# Patient Record
Sex: FEMALE | Race: WHITE | NOT HISPANIC OR LATINO | Employment: UNEMPLOYED | URBAN - METROPOLITAN AREA
[De-identification: names, ages, dates, MRNs, and addresses within clinical notes are randomized per-mention and may not be internally consistent; named-entity substitution may affect disease eponyms.]

---

## 2023-04-14 DIAGNOSIS — I10 ESSENTIAL (PRIMARY) HYPERTENSION: ICD-10-CM

## 2023-04-19 RX ORDER — DILTIAZEM HYDROCHLORIDE 60 MG/1
TABLET, FILM COATED ORAL
Qty: 60 TABLET | Refills: 0 | Status: SHIPPED | OUTPATIENT
Start: 2023-04-19 | End: 2023-04-20 | Stop reason: SDUPTHER

## 2023-04-20 RX ORDER — DILTIAZEM HYDROCHLORIDE 60 MG/1
60 TABLET, FILM COATED ORAL DAILY
Qty: 60 TABLET | Refills: 0 | Status: SHIPPED | OUTPATIENT
Start: 2023-04-20 | End: 2023-06-14

## 2023-05-05 DIAGNOSIS — I10 BENIGN ESSENTIAL HYPERTENSION: ICD-10-CM

## 2023-05-05 RX ORDER — TRIAMTERENE/HYDROCHLOROTHIAZID 37.5-25 MG
1 TABLET ORAL DAILY
COMMUNITY
End: 2023-05-05 | Stop reason: SDUPTHER

## 2023-05-08 RX ORDER — TRIAMTERENE/HYDROCHLOROTHIAZID 37.5-25 MG
1 TABLET ORAL DAILY
Qty: 30 TABLET | Refills: 0 | Status: SHIPPED | OUTPATIENT
Start: 2023-05-08 | End: 2023-05-10

## 2023-06-13 DIAGNOSIS — I10 ESSENTIAL (PRIMARY) HYPERTENSION: ICD-10-CM

## 2023-06-14 RX ORDER — DILTIAZEM HYDROCHLORIDE 60 MG/1
TABLET, FILM COATED ORAL
Qty: 30 TABLET | Refills: 0 | Status: SHIPPED | OUTPATIENT
Start: 2023-06-14 | End: 2023-08-11

## 2023-06-23 ENCOUNTER — OFFICE VISIT (OUTPATIENT)
Dept: PRIMARY CARE | Facility: CLINIC | Age: 88
End: 2023-06-23
Payer: MEDICARE

## 2023-06-23 VITALS
OXYGEN SATURATION: 94 % | BODY MASS INDEX: 22.04 KG/M2 | WEIGHT: 128.4 LBS | HEART RATE: 67 BPM | SYSTOLIC BLOOD PRESSURE: 124 MMHG | DIASTOLIC BLOOD PRESSURE: 78 MMHG

## 2023-06-23 DIAGNOSIS — R73.02 IGT (IMPAIRED GLUCOSE TOLERANCE): ICD-10-CM

## 2023-06-23 DIAGNOSIS — E03.9 HYPOTHYROIDISM, UNSPECIFIED TYPE: ICD-10-CM

## 2023-06-23 DIAGNOSIS — D64.9 ANEMIA, UNSPECIFIED TYPE: Primary | ICD-10-CM

## 2023-06-23 DIAGNOSIS — E78.5 DYSLIPIDEMIA: ICD-10-CM

## 2023-06-23 DIAGNOSIS — Z00.00 ROUTINE GENERAL MEDICAL EXAMINATION AT A HEALTH CARE FACILITY: ICD-10-CM

## 2023-06-23 PROCEDURE — 1159F MED LIST DOCD IN RCRD: CPT | Performed by: EMERGENCY MEDICINE

## 2023-06-23 PROCEDURE — 1157F ADVNC CARE PLAN IN RCRD: CPT | Performed by: EMERGENCY MEDICINE

## 2023-06-23 PROCEDURE — 99214 OFFICE O/P EST MOD 30 MIN: CPT | Performed by: EMERGENCY MEDICINE

## 2023-06-23 RX ORDER — LATANOPROST 50 UG/ML
SOLUTION/ DROPS OPHTHALMIC
COMMUNITY

## 2023-06-23 RX ORDER — SIMVASTATIN 20 MG/1
20 TABLET, FILM COATED ORAL DAILY
COMMUNITY
End: 2023-10-12 | Stop reason: SDUPTHER

## 2023-06-23 RX ORDER — ASPIRIN 81 MG/1
1 TABLET ORAL DAILY
COMMUNITY
Start: 2020-06-10

## 2023-06-23 RX ORDER — EPINEPHRINE 0.22MG
1 AEROSOL WITH ADAPTER (ML) INHALATION DAILY
COMMUNITY
Start: 2020-06-10

## 2023-06-23 ASSESSMENT — ENCOUNTER SYMPTOMS
OCCASIONAL FEELINGS OF UNSTEADINESS: 0
LOSS OF SENSATION IN FEET: 0
DEPRESSION: 0

## 2023-06-23 NOTE — PROGRESS NOTES
Diagnoses/Problems     · Encounter for preventive health examination (V70.0) (Z00.00)     · Anxiety and depression (300.00,311) (F41.9,F32.A)   · Benign essential hypertension (401.1) (I10)   · Added by Problem List Migration; 2013-12-14   · COPD (chronic obstructive pulmonary disease) (496) (J44.9)   · Hyperlipidemia (272.4) (E78.5)     Provider Impressions     88-year-old lady for office visit      Kidney stone- passed. Follows with urology. Currently does not have any issues     Hypertension- well controlled on triamterene-HCTZ and Cardizem     Hyperlipidemia- on simvastatin     Glaucoma-she is on eyedrops and has a regular eye doctor follow-up     COPD- currently does not take any medications     Anxiety and depression- doing well without medications, has declined medication in past      States that she takes Xarelto although this is not in our record     Preventive care  At the age of 88, she is not a candidate for any preventive care     Follow-up in 3 months or as needed      Chief Complaint     Follow up      History of Present IllnessThis is a 88-year-old lady who is here for an office visit     Patient is here for a follow up on long term health maintenance and medication management.  Small sore on her nose from previous fall having difficulty healing. Plans to see a dermatologist.      History from hospitalization-  Patient was admitted with positive COVID test  A chest x-ray did not show any pneumonia  ID recommended monoclonal antibody and steroids  Patient declined this  Her pulse ox is normal and she was discharged in stable condition     Past history includes hypertension, hyperlipidemia, COPD, current and chronic nicotine dependence, previous colonoscopy, kidney stone     Family history is not contributory     Social history-continues to smoke  Denies alcohol or drugs      Review of Systems  Comprehensive review of symptoms was not positive for any symptoms other than HPI        Active Problems      · Anxiety and depression (300.00,311) (F41.9,F32.A)   · Benign essential hypertension (401.1) (I10)   · Added by Problem List Migration; 2013-12-14   · Bursitis (727.3) (M71.9)   · Cataract, acquired (366.9) (H26.9)   · COPD (chronic obstructive pulmonary disease) (496) (J44.9)   · COVID-19 (079.89) (U07.1)   · Encounter for Medicare annual wellness exam (V70.0) (Z00.00)   · Encounter for support and coordination of transition of care (V65.8) (Z76.89)   · Enlarged lymph node (785.6) (R59.9)   · Essential hypertension (401.9) (I10)   · High cholesterol (272.0) (E78.00)   · Hyperlipidemia (272.4) (E78.5)   · IGT (impaired glucose tolerance) (790.22) (R73.02)   · Insomnia (780.52) (G47.00)   · Palpitations (785.1) (R00.2)   · Vitamin D insufficiency (268.9) (E55.9)     Social History     · Current every day smoker (305.1) (F17.200)   · Occasional alcohol use     Allergies     · No Known Drug Allergies   Recorded By: Raeann Christianson; 4/4/2014 11:40:44 AM     Current Meds     Medication Name Instruction   Aspirin 81 MG Oral Tablet Delayed Release TAKE 1 TABLET DAILY.   CoQ10 100 MG Oral Capsule TAKE 1 CAPSULE Daily   dilTIAZem HCl - 60 MG Oral Tablet Take 1 tablet daily   dilTIAZem HCl ER Beads 120 MG Oral Capsule Extended Release 24 Hour TAKE 1 CAPSULE ONCE DAILY.   Latanoprost 0.005 % Ophthalmic Solution INSTILL 1 DROP IN BOTH EYES AT BEDTIME.   Simvastatin 20 MG Oral Tablet TAKE 1 TABLET DAILY.   Triamterene-HCTZ 37.5-25 MG Oral Tablet Take 1 tablet daily      Vitals  Vital Signs     Recorded: [id number] 01:17PM   Heart Rate 70   Systolic 115   Diastolic 73   Height 5 ft 4 in   Weight 130 lb    BMI Calculated 22.31 kg/m2   BSA Calculated 1.63   Tobacco Use a) Yes   Falls Screening (Age 18+) b) One or more falls in the last year   O2 Saturation 90      Physical Exam  General appearance: Alert and in no acute distress  HEENT: Normal Inspection  Neck - Normal Inspection.   Respiratory : No respiratory distress.    Cardiovascular: heart rate normal. No gallop  Back - normal inspection  Skin inspection:Warm  Musculoskeletal : No deformities.   No redness, warmth or pain  Neuro : Grossly Intact  Psychiatric : Cooperative

## 2023-07-06 LAB
NON-UH HIE A/G RATIO: 1.1
NON-UH HIE ALB: 4.1 G/DL (ref 3.4–5)
NON-UH HIE ALK PHOS: 115 UNIT/L (ref 46–116)
NON-UH HIE BASO COUNT: 0.04 X1000 (ref 0–0.2)
NON-UH HIE BASOS %: 0.5 %
NON-UH HIE BILIRUBIN, TOTAL: 0.3 MG/DL (ref 0.2–1)
NON-UH HIE BUN/CREAT RATIO: 27.8
NON-UH HIE BUN: 25 MG/DL (ref 9–23)
NON-UH HIE CALCIUM: 9.6 MG/DL (ref 8.7–10.4)
NON-UH HIE CALCULATED LDL CHOLESTEROL: 76 MG/DL (ref 60–130)
NON-UH HIE CALCULATED OSMOLALITY: 287 MOSM/KG (ref 275–295)
NON-UH HIE CHLORIDE: 104 MMOL/L (ref 98–107)
NON-UH HIE CHOLESTEROL: 166 MG/DL (ref 100–200)
NON-UH HIE CO2, VENOUS: 27 MMOL/L (ref 20–31)
NON-UH HIE CREATININE: 0.9 MG/DL (ref 0.5–0.8)
NON-UH HIE DIFF?: NO
NON-UH HIE EOS COUNT: 0.09 X1000 (ref 0–0.5)
NON-UH HIE EOSIN %: 1.2 %
NON-UH HIE GFR AA: >60
NON-UH HIE GLOBULIN: 3.9 G/DL
NON-UH HIE GLOMERULAR FILTRATION RATE: 59 ML/MIN/1.73M?
NON-UH HIE GLUCOSE: 82 MG/DL (ref 74–106)
NON-UH HIE GOT: 23 UNIT/L (ref 15–37)
NON-UH HIE GPT: 17 UNIT/L (ref 10–49)
NON-UH HIE HCT: 44.5 % (ref 36–46)
NON-UH HIE HDL CHOLESTEROL: 68 MG/DL (ref 40–60)
NON-UH HIE HGB A1C: 5.7 %
NON-UH HIE HGB: 14.8 G/DL (ref 12–16)
NON-UH HIE INSTR WBC: 7.5
NON-UH HIE K: 3.9 MMOL/L (ref 3.5–5.1)
NON-UH HIE LYMPH %: 23.1 %
NON-UH HIE LYMPH COUNT: 1.74 X1000 (ref 1.2–4.8)
NON-UH HIE MCH: 30.7 PG (ref 27–34)
NON-UH HIE MCHC: 33.3 G/DL (ref 32–37)
NON-UH HIE MCV: 92 FL (ref 80–100)
NON-UH HIE MONO %: 10 %
NON-UH HIE MONO COUNT: 0.75 X1000 (ref 0.1–1)
NON-UH HIE MPV: 8.2 FL (ref 7.4–10.4)
NON-UH HIE NA: 142 MMOL/L (ref 135–145)
NON-UH HIE NEUTROPHIL %: 65.3 %
NON-UH HIE NEUTROPHIL COUNT (ANC): 4.92 X1000 (ref 1.4–8.8)
NON-UH HIE NUCLEATED RBC: 0 /100WBC
NON-UH HIE PLATELET: 333 X10 (ref 150–450)
NON-UH HIE RBC: 4.84 X10 (ref 4.2–5.4)
NON-UH HIE RDW: 14.5 % (ref 11.5–14.5)
NON-UH HIE TOTAL CHOL/HDL CHOL RATIO: 2.4
NON-UH HIE TOTAL PROTEIN: 8 G/DL (ref 5.7–8.2)
NON-UH HIE TRIGLYCERIDES: 112 MG/DL (ref 30–150)
NON-UH HIE TSH: 2.09 UIU/ML (ref 0.55–4.78)
NON-UH HIE WBC: 7.5 X10 (ref 4.5–11)

## 2023-07-31 DIAGNOSIS — I10 BENIGN ESSENTIAL HYPERTENSION: ICD-10-CM

## 2023-07-31 RX ORDER — TRIAMTERENE/HYDROCHLOROTHIAZID 37.5-25 MG
1 TABLET ORAL DAILY
Qty: 90 TABLET | Refills: 1 | Status: SHIPPED | OUTPATIENT
Start: 2023-07-31 | End: 2024-05-03 | Stop reason: SDUPTHER

## 2023-08-09 DIAGNOSIS — I10 ESSENTIAL (PRIMARY) HYPERTENSION: ICD-10-CM

## 2023-08-11 RX ORDER — DILTIAZEM HYDROCHLORIDE 60 MG/1
60 TABLET, FILM COATED ORAL DAILY
Qty: 90 TABLET | Refills: 1 | Status: SHIPPED | OUTPATIENT
Start: 2023-08-11 | End: 2023-08-18 | Stop reason: SDUPTHER

## 2023-08-18 DIAGNOSIS — I10 ESSENTIAL (PRIMARY) HYPERTENSION: ICD-10-CM

## 2023-08-18 RX ORDER — DILTIAZEM HYDROCHLORIDE 60 MG/1
60 TABLET, FILM COATED ORAL DAILY
Qty: 90 TABLET | Refills: 1 | Status: SHIPPED | OUTPATIENT
Start: 2023-08-18

## 2023-10-12 DIAGNOSIS — E78.5 HYPERLIPIDEMIA, UNSPECIFIED HYPERLIPIDEMIA TYPE: ICD-10-CM

## 2023-10-12 RX ORDER — SIMVASTATIN 20 MG/1
20 TABLET, FILM COATED ORAL DAILY
Qty: 90 TABLET | Refills: 1 | Status: SHIPPED | OUTPATIENT
Start: 2023-10-12

## 2023-10-27 ENCOUNTER — TELEPHONE (OUTPATIENT)
Dept: PRIMARY CARE | Facility: CLINIC | Age: 88
End: 2023-10-27
Payer: MEDICARE

## 2023-11-01 ENCOUNTER — TELEPHONE (OUTPATIENT)
Dept: PRIMARY CARE | Facility: CLINIC | Age: 88
End: 2023-11-01
Payer: MEDICARE

## 2023-11-01 NOTE — TELEPHONE ENCOUNTER
Trixie Webster Mariam FYI    PT was woken up at 4 in the morning with a reminder call for an appointment.  She got up and almost fell over her oxygen tubing thinking it was something terrible regarding her adult children.     PT's family is real upset and wanted the Dr to know.  They do know it didn't come directly from our office per say, but they are concerned about a glitch in the phone reminder system.

## 2023-11-03 ENCOUNTER — OFFICE VISIT (OUTPATIENT)
Dept: PRIMARY CARE | Facility: CLINIC | Age: 88
End: 2023-11-03
Payer: MEDICARE

## 2023-11-03 VITALS
DIASTOLIC BLOOD PRESSURE: 47 MMHG | SYSTOLIC BLOOD PRESSURE: 117 MMHG | BODY MASS INDEX: 22.2 KG/M2 | HEIGHT: 64 IN | HEART RATE: 67 BPM | OXYGEN SATURATION: 95 % | WEIGHT: 130 LBS

## 2023-11-03 DIAGNOSIS — Z23 NEED FOR INFLUENZA VACCINATION: ICD-10-CM

## 2023-11-03 DIAGNOSIS — S22.49XD CLOSED FRACTURE OF MULTIPLE RIBS WITH ROUTINE HEALING, UNSPECIFIED LATERALITY, SUBSEQUENT ENCOUNTER: ICD-10-CM

## 2023-11-03 DIAGNOSIS — N63.20 MASS OF LEFT BREAST, UNSPECIFIED QUADRANT: ICD-10-CM

## 2023-11-03 DIAGNOSIS — Z76.89 ENCOUNTER FOR SUPPORT AND COORDINATION OF TRANSITION OF CARE: Primary | ICD-10-CM

## 2023-11-03 PROCEDURE — 1159F MED LIST DOCD IN RCRD: CPT | Performed by: EMERGENCY MEDICINE

## 2023-11-03 PROCEDURE — 99495 TRANSJ CARE MGMT MOD F2F 14D: CPT | Performed by: EMERGENCY MEDICINE

## 2023-11-03 NOTE — PROGRESS NOTES
Diagnoses/Problems     · Encounter for preventive health examination (V70.0) (Z00.00)     · Anxiety and depression (300.00,311) (F41.9,F32.A)   · Benign essential hypertension (401.1) (I10)   · Added by Problem List Migration; 2013-12-14   · COPD (chronic obstructive pulmonary disease) (496) (J44.9)   · Hyperlipidemia (272.4) (E78.5)     Provider Impressions     88-year-old lady for transition of care    Multiple rib fractures-she was seen by trauma surgery.  No pneumothorax.  Currently pain is controlled.    Left breast abnormality found on CT-we will obtain mammogram    Hypertension- well controlled on triamterene-HCTZ and Cardizem     Hyperlipidemia- on simvastatin     Glaucoma-she is on eyedrops and has a regular eye doctor follow-up     COPD- currently does not take any medications     Anxiety and depression- doing well without medications, has declined medication in past      States that she takes Xarelto although this is not in our record     Preventive care  At the age of 88, she is not a candidate for any preventive care     Follow-up in1-2 months for Medicare wellness or as needed      Chief Complaint     Follow up      History of Present IllnessThis is a 88-year-old lady who is here for an transition of care     Patient was discharged from [facility] on October 25_ and there was interactive contact by patient/family or facility staff on behalf of patient with me/office within 2 working days regarding patient's transition    Patient was recently admitted to the hospital.  Patient's history regarding the reason for hospital admission and the course in the hospital was reviewed with patinet and/or family.  Patient's medical records including lab work, radiology and other medical notes were reviewed.  His medication list prior to admission and discharge medication list are compared and updated.    Patient encounter was done face-to-face    Patient is unable to give detailed history and therefore  history is obtained from the chart    History from hospitalization-    Patient was seen for accidental fall.  Her initial CT without contrast did not reveal any injuries.  She later on had a CT angio that revealed multiple rib fractures   There was no pneumothorax   She was cleared by trauma   There was incidental finding of left breast abnormality on CT    past history includes hypertension, hyperlipidemia, COPD, current and chronic nicotine dependence, previous colonoscopy, kidney stone     Family history is not contributory     Social history-continues to smoke  Denies alcohol or drugs      Review of Systems  Comprehensive review of symptoms was not positive for any symptoms other than HPI        Active Problems     · Anxiety and depression (300.00,311) (F41.9,F32.A)   · Benign essential hypertension (401.1) (I10)   · Added by Problem List Migration; 2013-12-14   · Bursitis (727.3) (M71.9)   · Cataract, acquired (366.9) (H26.9)   · COPD (chronic obstructive pulmonary disease) (496) (J44.9)   · COVID-19 (079.89) (U07.1)   · Encounter for Medicare annual wellness exam (V70.0) (Z00.00)   · Encounter for support and coordination of transition of care (V65.8) (Z76.89)   · Enlarged lymph node (785.6) (R59.9)   · Essential hypertension (401.9) (I10)   · High cholesterol (272.0) (E78.00)   · Hyperlipidemia (272.4) (E78.5)   · IGT (impaired glucose tolerance) (790.22) (R73.02)   · Insomnia (780.52) (G47.00)   · Palpitations (785.1) (R00.2)   · Vitamin D insufficiency (268.9) (E55.9)     Social History     · Current every day smoker (305.1) (F17.200)   · Occasional alcohol use     Allergies     · No Known Drug Allergies   Recorded By: Raeann Christianson; 4/4/2014 11:40:44 AM     Current Meds     Medication Name Instruction   Aspirin 81 MG Oral Tablet Delayed Release TAKE 1 TABLET DAILY.   CoQ10 100 MG Oral Capsule TAKE 1 CAPSULE Daily   dilTIAZem HCl - 60 MG Oral Tablet Take 1 tablet daily   dilTIAZem HCl ER Beads 120 MG Oral  Capsule Extended Release 24 Hour TAKE 1 CAPSULE ONCE DAILY.   Latanoprost 0.005 % Ophthalmic Solution INSTILL 1 DROP IN BOTH EYES AT BEDTIME.   Simvastatin 20 MG Oral Tablet TAKE 1 TABLET DAILY.   Triamterene-HCTZ 37.5-25 MG Oral Tablet Take 1 tablet daily      Vitals  Vital Signs     Recorded: [id number] 01:17PM   Heart Rate 70   Systolic 115   Diastolic 73   Height 5 ft 4 in   Weight 130 lb    BMI Calculated 22.31 kg/m2   BSA Calculated 1.63   Tobacco Use a) Yes   Falls Screening (Age 18+) b) One or more falls in the last year   O2 Saturation 90      Physical Exam  General appearance: Alert and in no acute distress  HEENT: Normal Inspection  Neck - Normal Inspection.   Respiratory : No respiratory distress.   Cardiovascular: heart rate normal. No gallop  Back - normal inspection  Skin inspection:Warm  Musculoskeletal : No deformities.   No redness, warmth or pain  Neuro : Grossly Intact  Psychiatric : Cooperative

## 2023-11-17 ENCOUNTER — TELEPHONE (OUTPATIENT)
Dept: PRIMARY CARE | Facility: CLINIC | Age: 88
End: 2023-11-17
Payer: MEDICARE

## 2023-11-21 ENCOUNTER — TELEPHONE (OUTPATIENT)
Dept: PRIMARY CARE | Facility: CLINIC | Age: 88
End: 2023-11-21
Payer: MEDICARE

## 2023-11-21 DIAGNOSIS — R92.322 SCATTERED FIBROGLANDULAR TISSUE DENSITY OF LEFT BREAST ON MAMMOGRAPHY: ICD-10-CM

## 2023-11-21 DIAGNOSIS — N63.0 MASS OF BREAST, UNSPECIFIED LATERALITY: ICD-10-CM

## 2023-11-21 DIAGNOSIS — R93.89 ABNORMAL FINDING ON CT SCAN: ICD-10-CM

## 2023-11-21 DIAGNOSIS — R92.8 ABNORMAL MAMMOGRAM: ICD-10-CM

## 2023-11-21 NOTE — TELEPHONE ENCOUNTER
ER ordered a CT of lungs and found mass on left breast.  Then Dr Ba did rounds and ordered an MRI which one found a cyst and another stated mass.    Needs order to have an ultrasound of breast vs a regular mammogram because of broken ribs.      There was only mammogram ordered which at this time is to painful for PTTyrese Reagan - daughter    Phone is:  [phone]    Fax to:  [facility]  [phone]

## 2023-11-27 PROCEDURE — G0180 MD CERTIFICATION HHA PATIENT: HCPCS | Performed by: EMERGENCY MEDICINE

## 2023-11-27 NOTE — TELEPHONE ENCOUNTER
DAUGHTER NOREEN CALLED AGAIN. EXPLAINED OFFICE WAS CLOSED FOR 4 DAYS. NEEDS QUESTIONS ANSWERED IN PREVIOUS MESSAGE FROM 11/22/23.    ALSO NEEDS A HUMIDIFICATION BOTTLE ADDED TO A LARGE OXYGEN TANK DUE TO DRY AIR AND BLOODY NOSE.    NEEDS TO GO [facility]: FAX: [phone]  ATTN.  DEPT   Location Indication Override (Is Already Calculated Based On Selected Body Location): Area M

## 2023-11-27 NOTE — TELEPHONE ENCOUNTER
Pt would like to know CT results. Daughter says she should have 3 CT results I only see 2 in the [facility] system

## 2024-01-29 ENCOUNTER — TELEPHONE (OUTPATIENT)
Dept: PRIMARY CARE | Facility: CLINIC | Age: 89
End: 2024-01-29
Payer: MEDICARE

## 2024-01-31 ENCOUNTER — OFFICE VISIT (OUTPATIENT)
Dept: PRIMARY CARE | Facility: CLINIC | Age: 89
End: 2024-01-31
Payer: MEDICARE

## 2024-01-31 VITALS
BODY MASS INDEX: 23.04 KG/M2 | WEIGHT: 130 LBS | SYSTOLIC BLOOD PRESSURE: 132 MMHG | OXYGEN SATURATION: 97 % | HEART RATE: 66 BPM | HEIGHT: 63 IN | DIASTOLIC BLOOD PRESSURE: 77 MMHG

## 2024-01-31 DIAGNOSIS — Z00.00 ENCOUNTER FOR MEDICARE ANNUAL WELLNESS EXAM: Primary | ICD-10-CM

## 2024-01-31 DIAGNOSIS — E78.2 MIXED HYPERLIPIDEMIA: ICD-10-CM

## 2024-01-31 DIAGNOSIS — J43.2 CENTRILOBULAR EMPHYSEMA (MULTI): ICD-10-CM

## 2024-01-31 DIAGNOSIS — G89.29 OTHER CHRONIC PAIN: ICD-10-CM

## 2024-01-31 DIAGNOSIS — I10 BENIGN ESSENTIAL HYPERTENSION: ICD-10-CM

## 2024-01-31 PROBLEM — E78.5 HYPERLIPIDEMIA: Status: ACTIVE | Noted: 2024-01-31

## 2024-01-31 PROBLEM — E03.9 HYPOTHYROIDISM: Status: ACTIVE | Noted: 2024-01-31

## 2024-01-31 PROBLEM — J44.9 COPD (CHRONIC OBSTRUCTIVE PULMONARY DISEASE) (MULTI): Status: ACTIVE | Noted: 2024-01-31

## 2024-01-31 PROBLEM — F32.A ANXIETY AND DEPRESSION: Status: ACTIVE | Noted: 2024-01-31

## 2024-01-31 PROBLEM — F41.9 ANXIETY AND DEPRESSION: Status: ACTIVE | Noted: 2024-01-31

## 2024-01-31 PROCEDURE — G0442 ANNUAL ALCOHOL SCREEN 15 MIN: HCPCS | Performed by: EMERGENCY MEDICINE

## 2024-01-31 PROCEDURE — 99497 ADVNCD CARE PLAN 30 MIN: CPT | Performed by: EMERGENCY MEDICINE

## 2024-01-31 PROCEDURE — 1157F ADVNC CARE PLAN IN RCRD: CPT | Performed by: EMERGENCY MEDICINE

## 2024-01-31 PROCEDURE — G0446 INTENS BEHAVE THER CARDIO DX: HCPCS | Performed by: EMERGENCY MEDICINE

## 2024-01-31 PROCEDURE — 1159F MED LIST DOCD IN RCRD: CPT | Performed by: EMERGENCY MEDICINE

## 2024-01-31 PROCEDURE — 1158F ADVNC CARE PLAN TLK DOCD: CPT | Performed by: EMERGENCY MEDICINE

## 2024-01-31 PROCEDURE — 1170F FXNL STATUS ASSESSED: CPT | Performed by: EMERGENCY MEDICINE

## 2024-01-31 PROCEDURE — 3075F SYST BP GE 130 - 139MM HG: CPT | Performed by: EMERGENCY MEDICINE

## 2024-01-31 PROCEDURE — 3078F DIAST BP <80 MM HG: CPT | Performed by: EMERGENCY MEDICINE

## 2024-01-31 PROCEDURE — G0439 PPPS, SUBSEQ VISIT: HCPCS | Performed by: EMERGENCY MEDICINE

## 2024-01-31 PROCEDURE — 1123F ACP DISCUSS/DSCN MKR DOCD: CPT | Performed by: EMERGENCY MEDICINE

## 2024-01-31 PROCEDURE — 99213 OFFICE O/P EST LOW 20 MIN: CPT | Performed by: EMERGENCY MEDICINE

## 2024-01-31 PROCEDURE — G0444 DEPRESSION SCREEN ANNUAL: HCPCS | Performed by: EMERGENCY MEDICINE

## 2024-01-31 ASSESSMENT — PATIENT HEALTH QUESTIONNAIRE - PHQ9
1. LITTLE INTEREST OR PLEASURE IN DOING THINGS: NOT AT ALL
SUM OF ALL RESPONSES TO PHQ9 QUESTIONS 1 AND 2: 0
2. FEELING DOWN, DEPRESSED OR HOPELESS: NOT AT ALL

## 2024-01-31 ASSESSMENT — ACTIVITIES OF DAILY LIVING (ADL)
TAKING_MEDICATION: INDEPENDENT
DRESSING: INDEPENDENT
BATHING: INDEPENDENT
DOING_HOUSEWORK: INDEPENDENT
MANAGING_FINANCES: INDEPENDENT
GROCERY_SHOPPING: INDEPENDENT

## 2024-01-31 ASSESSMENT — ENCOUNTER SYMPTOMS
LOSS OF SENSATION IN FEET: 0
OCCASIONAL FEELINGS OF UNSTEADINESS: 0
DEPRESSION: 0

## 2024-01-31 NOTE — PROGRESS NOTES
"Subjective   Reason for Visit: Cecille Rico is an 88 y.o. female here for a Medicare Wellness visit.     Past Medical, Surgical, and Family History reviewed and updated in chart.    Reviewed all medications by prescribing practitioner or clinical pharmacist (such as prescriptions, OTCs, herbal therapies and supplements) and documented in the medical record.    HPI  88 year old female here for Medicare wellness and follow up visit     Patient is here for a follow up on long term health maintenance and medication management.  Doing well, no acute concerns.     Past history includes hypertension, hyperlipidemia, COPD, current and chronic nicotine dependence, previous colonoscopy, kidney stone, rib fracture     Family history is not contributory     Social history-continues to smoke  Denies alcohol or drugs    Patient Care Team:  Jaylan Ba MD as PCP - General (Internal Medicine)     Review of Systems  Comprehensive review of symptoms was not positive for any symptoms other than HPI.      Objective   Vitals:  /77   Pulse 66   Ht 1.6 m (5' 3\")   Wt 59 kg (130 lb)   SpO2 97%   BMI 23.03 kg/m²       Physical Exam  General appearance: Alert and in no acute distress  HEENT: Normal Inspection.  Neck: Normal Inspectiopn  Respiratory: No respiratory distress.   Cardiovascular: heart rate normal. No gallop  Back: normal inspection  Skin inspection: Warm  Musculoskeletal: No deformities  Neuro: Grossly Intact  Psychiatric: Cooperative     Assessment/Plan   Problem List Items Addressed This Visit       Benign essential hypertension    Overview     Comment on above: Added by Problem List Migration; 2013-12-14;         Centrilobular emphysema (CMS/[facility])    Hyperlipidemia     Other Visit Diagnoses       Encounter for Medicare annual wellness exam    -  Primary    Other chronic pain        Relevant Orders    Disability Placard          Multiple rib fractures- symptoms resolved     Left breast abnormality found on CT- " follow up mammogram negative for acute abnormality      Hypertension- well controlled on triamterene-HCTZ and Cardizem     Hyperlipidemia- on simvastatin     Glaucoma-she is on eyedrops and has a regular eye doctor follow-up     COPD- currently does not take any medications     Anxiety and depression- doing well without medications, has declined medication in past      Preventive care  At the age of 88, she is not a candidate for any preventive care    PH Q-9 depression screening was completed by authorized employee of the practice and answer of the questionnaire were explained and discussed with the patient.    Face-to-face with discussion completed with this individual regarding their cardiovascular risk and behavioral therapies of nutritional choices, exercise and elimination of habits contradicting to the risk. We agreed on how they may be able to reduce their current cardiovascular risk.     Screening for alcohol use completed.      I discussed advanced care planning including the explanation and discussion of advanced directives. If patient does not have current up to date documents, examples and information provided on how to create both living will and power of . Patient was encouraged to work on completing these documents.  Information and advise was also provided on DO NOT RESUSCITATE and patient encouraged to consider this  Patient signed DNR CCA.     Scribe Attestation  By signing my name below, I, Kimberly Drew Scrjh   attest that this documentation has been prepared under the direction and in the presence of Jaylan Ba MD.

## 2024-05-02 ENCOUNTER — TELEPHONE (OUTPATIENT)
Dept: PRIMARY CARE | Facility: CLINIC | Age: 89
End: 2024-05-02
Payer: MEDICARE

## 2024-05-02 DIAGNOSIS — I10 BENIGN ESSENTIAL HYPERTENSION: ICD-10-CM

## 2024-05-03 DIAGNOSIS — I10 BENIGN ESSENTIAL HYPERTENSION: ICD-10-CM

## 2024-05-03 RX ORDER — TRIAMTERENE/HYDROCHLOROTHIAZID 37.5-25 MG
1 TABLET ORAL DAILY
Qty: 90 TABLET | Refills: 1 | Status: SHIPPED | OUTPATIENT
Start: 2024-05-03

## 2024-06-13 DIAGNOSIS — I10 ESSENTIAL (PRIMARY) HYPERTENSION: ICD-10-CM

## 2024-06-13 RX ORDER — DILTIAZEM HYDROCHLORIDE 60 MG/1
60 TABLET, FILM COATED ORAL DAILY
Qty: 90 TABLET | Refills: 0 | Status: SHIPPED | OUTPATIENT
Start: 2024-06-13

## 2024-09-11 DIAGNOSIS — I10 ESSENTIAL (PRIMARY) HYPERTENSION: ICD-10-CM

## 2024-09-12 RX ORDER — DILTIAZEM HYDROCHLORIDE 60 MG/1
60 TABLET, FILM COATED ORAL DAILY
Qty: 90 TABLET | Refills: 1 | Status: SHIPPED | OUTPATIENT
Start: 2024-09-12

## 2024-09-16 DIAGNOSIS — E78.5 HYPERLIPIDEMIA, UNSPECIFIED HYPERLIPIDEMIA TYPE: ICD-10-CM

## 2024-09-17 RX ORDER — SIMVASTATIN 20 MG/1
20 TABLET, FILM COATED ORAL DAILY
Qty: 90 TABLET | Refills: 0 | Status: SHIPPED | OUTPATIENT
Start: 2024-09-17

## 2024-09-25 ENCOUNTER — APPOINTMENT (OUTPATIENT)
Dept: PRIMARY CARE | Facility: CLINIC | Age: 89
End: 2024-09-25
Payer: MEDICARE

## 2024-09-25 VITALS
HEART RATE: 69 BPM | OXYGEN SATURATION: 93 % | WEIGHT: 130 LBS | SYSTOLIC BLOOD PRESSURE: 113 MMHG | HEIGHT: 64 IN | BODY MASS INDEX: 22.2 KG/M2 | DIASTOLIC BLOOD PRESSURE: 70 MMHG

## 2024-09-25 DIAGNOSIS — F41.9 ANXIETY AND DEPRESSION: ICD-10-CM

## 2024-09-25 DIAGNOSIS — Z00.00 ROUTINE GENERAL MEDICAL EXAMINATION AT A HEALTH CARE FACILITY: ICD-10-CM

## 2024-09-25 DIAGNOSIS — Z23 FLU VACCINE NEED: ICD-10-CM

## 2024-09-25 DIAGNOSIS — I10 BENIGN ESSENTIAL HYPERTENSION: Primary | ICD-10-CM

## 2024-09-25 DIAGNOSIS — D64.9 ANEMIA, UNSPECIFIED TYPE: ICD-10-CM

## 2024-09-25 DIAGNOSIS — J44.9 CHRONIC OBSTRUCTIVE PULMONARY DISEASE, UNSPECIFIED COPD TYPE (MULTI): ICD-10-CM

## 2024-09-25 DIAGNOSIS — R73.02 IGT (IMPAIRED GLUCOSE TOLERANCE): ICD-10-CM

## 2024-09-25 DIAGNOSIS — E78.5 DYSLIPIDEMIA: ICD-10-CM

## 2024-09-25 DIAGNOSIS — E03.9 HYPOTHYROIDISM, UNSPECIFIED TYPE: ICD-10-CM

## 2024-09-25 DIAGNOSIS — F32.A ANXIETY AND DEPRESSION: ICD-10-CM

## 2024-09-25 PROCEDURE — 3074F SYST BP LT 130 MM HG: CPT | Performed by: EMERGENCY MEDICINE

## 2024-09-25 PROCEDURE — 99214 OFFICE O/P EST MOD 30 MIN: CPT | Performed by: EMERGENCY MEDICINE

## 2024-09-25 PROCEDURE — 1159F MED LIST DOCD IN RCRD: CPT | Performed by: EMERGENCY MEDICINE

## 2024-09-25 PROCEDURE — 3078F DIAST BP <80 MM HG: CPT | Performed by: EMERGENCY MEDICINE

## 2024-09-25 PROCEDURE — 1158F ADVNC CARE PLAN TLK DOCD: CPT | Performed by: EMERGENCY MEDICINE

## 2024-09-25 PROCEDURE — 1157F ADVNC CARE PLAN IN RCRD: CPT | Performed by: EMERGENCY MEDICINE

## 2024-09-25 PROCEDURE — 4004F PT TOBACCO SCREEN RCVD TLK: CPT | Performed by: EMERGENCY MEDICINE

## 2024-09-25 PROCEDURE — 1123F ACP DISCUSS/DSCN MKR DOCD: CPT | Performed by: EMERGENCY MEDICINE

## 2024-09-25 ASSESSMENT — PATIENT HEALTH QUESTIONNAIRE - PHQ9
2. FEELING DOWN, DEPRESSED OR HOPELESS: NOT AT ALL
SUM OF ALL RESPONSES TO PHQ9 QUESTIONS 1 AND 2: 0
1. LITTLE INTEREST OR PLEASURE IN DOING THINGS: NOT AT ALL

## 2024-09-25 NOTE — PROGRESS NOTES
Subjective   Reason for Visit: Cecille Rico is an 89 y.o. female here for a follow up visit    Past Medical, Surgical, and Family History reviewed and updated in chart.    Reviewed all medications by prescribing practitioner or clinical pharmacist (such as prescriptions, OTCs, herbal therapies and supplements) and documented in the medical record.    HPI  88 year old female here for follow up visit     Patient is here for a follow up on long term health maintenance and medication management.  Doing well, no acute concerns.     Past history includes hypertension, hyperlipidemia, COPD, current and chronic nicotine dependence, previous colonoscopy, kidney stone, rib fracture     Family history is not contributory     Social history-continues to smoke  Denies alcohol or drugs    Patient Care Team:  Jaylan Ba MD as PCP - General (Internal Medicine)     Review of Systems  Comprehensive review of symptoms was not positive for any symptoms other than HPI.      Objective       Physical Exam  General appearance: Alert and in no acute distress  HEENT: Normal Inspection.  Neck: Normal Inspectiopn  Respiratory: No respiratory distress.   Cardiovascular: heart rate normal. No gallop  Back: normal inspection  Skin inspection: Warm  Musculoskeletal: No deformities  Neuro: Grossly Intact  Psychiatric: Cooperative     Assessment/Plan   Problem List Items Addressed This Visit    None  Visit Diagnoses       Flu vaccine need            Patient presents for follow up visit    Multiple rib fractures - symptoms resolved     Hypertension - well controlled on triamterene-HCTZ and Cardizem     Hyperlipidemia - Patient is on simvastatin     Glaucoma - she is on eyedrops and has a regular eye doctor follow-up.     COPD - currently does not take any medications, patient uses oxygen as necessary.     Anxiety and depression - doing well without medications, has declined medication in past.     Left breast abnormality found on CT - follow up  mammogram negative for acute abnormality     Labs ordered     Preventive care  At the age of 89, she is not a candidate for any preventive care    Follow up in 3 months or as necessary

## 2024-10-01 LAB
NON-UH HIE A/G RATIO: 1
NON-UH HIE ALB: 3.8 G/DL (ref 3.4–5)
NON-UH HIE ALK PHOS: 96 UNIT/L (ref 45–117)
NON-UH HIE BASO COUNT: 0.05 X1000 (ref 0–0.2)
NON-UH HIE BASOS %: 0.6 %
NON-UH HIE BILIRUBIN, TOTAL: 0.5 MG/DL (ref 0.3–1.2)
NON-UH HIE BUN/CREAT RATIO: 39
NON-UH HIE BUN: 39 MG/DL (ref 9–23)
NON-UH HIE CALCIUM: 10 MG/DL (ref 8.7–10.4)
NON-UH HIE CALCULATED LDL CHOLESTEROL: 92 MG/DL (ref 60–130)
NON-UH HIE CALCULATED OSMOLALITY: 292 MOSM/KG (ref 275–295)
NON-UH HIE CHLORIDE: 106 MMOL/L (ref 98–107)
NON-UH HIE CHOLESTEROL: 179 MG/DL (ref 100–200)
NON-UH HIE CO2, VENOUS: 26 MMOL/L (ref 20–31)
NON-UH HIE CREATININE: 1 MG/DL (ref 0.5–0.8)
NON-UH HIE DIFF?: NO
NON-UH HIE EOS COUNT: 0.12 X1000 (ref 0–0.5)
NON-UH HIE EOSIN %: 1.3 %
NON-UH HIE GFR AA: >60
NON-UH HIE GLOBULIN: 4 G/DL
NON-UH HIE GLOMERULAR FILTRATION RATE: 52 ML/MIN/1.73M?
NON-UH HIE GLUCOSE: 92 MG/DL (ref 74–106)
NON-UH HIE GOT: 23 UNIT/L (ref 15–37)
NON-UH HIE GPT: 15 UNIT/L (ref 10–49)
NON-UH HIE HCT: 42.5 % (ref 36–46)
NON-UH HIE HDL CHOLESTEROL: 63 MG/DL (ref 40–60)
NON-UH HIE HGB A1C: 5.6 %
NON-UH HIE HGB: 14.3 G/DL (ref 12–16)
NON-UH HIE INSTR WBC: 8.9
NON-UH HIE K: 3.6 MMOL/L (ref 3.5–5.1)
NON-UH HIE LYMPH %: 21.5 %
NON-UH HIE LYMPH COUNT: 1.91 X1000 (ref 1.2–4.8)
NON-UH HIE MCH: 31 PG (ref 27–34)
NON-UH HIE MCHC: 33.7 G/DL (ref 32–37)
NON-UH HIE MCV: 92 FL (ref 80–100)
NON-UH HIE MONO %: 9.3 %
NON-UH HIE MONO COUNT: 0.83 X1000 (ref 0.1–1)
NON-UH HIE MPV: 8.1 FL (ref 7.4–10.4)
NON-UH HIE NA: 142 MMOL/L (ref 135–145)
NON-UH HIE NEUTROPHIL %: 67.2 %
NON-UH HIE NEUTROPHIL COUNT (ANC): 5.96 X1000 (ref 1.4–8.8)
NON-UH HIE NUCLEATED RBC: 0 /100WBC
NON-UH HIE PLATELET: 352 X10 (ref 150–450)
NON-UH HIE RBC: 4.62 X10 (ref 4.2–5.4)
NON-UH HIE RDW: 14.4 % (ref 11.5–14.5)
NON-UH HIE TOTAL CHOL/HDL CHOL RATIO: 2.8
NON-UH HIE TOTAL PROTEIN: 7.8 G/DL (ref 5.7–8.2)
NON-UH HIE TRIGLYCERIDES: 118 MG/DL (ref 30–150)
NON-UH HIE TSH: 2.02 UIU/ML (ref 0.55–4.78)
NON-UH HIE VITAMIN B12: 398 PG/ML (ref 211–911)
NON-UH HIE WBC: 8.9 X10 (ref 4.5–11)

## 2024-10-30 DIAGNOSIS — I10 BENIGN ESSENTIAL HYPERTENSION: ICD-10-CM

## 2024-10-30 RX ORDER — TRIAMTERENE/HYDROCHLOROTHIAZID 37.5-25 MG
1 TABLET ORAL DAILY
Qty: 90 TABLET | Refills: 1 | Status: SHIPPED | OUTPATIENT
Start: 2024-10-30

## 2025-03-11 ENCOUNTER — TELEPHONE (OUTPATIENT)
Dept: PRIMARY CARE | Facility: CLINIC | Age: 89
End: 2025-03-11
Payer: MEDICARE

## 2025-03-11 DIAGNOSIS — I10 ESSENTIAL (PRIMARY) HYPERTENSION: ICD-10-CM

## 2025-03-11 RX ORDER — DILTIAZEM HYDROCHLORIDE 60 MG/1
60 TABLET, FILM COATED ORAL DAILY
Qty: 30 TABLET | Refills: 0 | Status: SHIPPED | OUTPATIENT
Start: 2025-03-11

## 2025-03-11 NOTE — TELEPHONE ENCOUNTER
Pharmacy requested refills. Left message for patient to make an appt for further refills. Will provide a month supply

## 2025-03-12 ENCOUNTER — TELEPHONE (OUTPATIENT)
Dept: PRIMARY CARE | Facility: CLINIC | Age: 89
End: 2025-03-12
Payer: MEDICARE

## 2025-03-17 DIAGNOSIS — E78.5 HYPERLIPIDEMIA, UNSPECIFIED HYPERLIPIDEMIA TYPE: ICD-10-CM

## 2025-03-17 RX ORDER — SIMVASTATIN 20 MG/1
20 TABLET, FILM COATED ORAL DAILY
Qty: 30 TABLET | Refills: 0 | Status: SHIPPED | OUTPATIENT
Start: 2025-03-17 | End: 2025-03-20

## 2025-03-20 RX ORDER — SIMVASTATIN 20 MG/1
20 TABLET, FILM COATED ORAL DAILY
Qty: 30 TABLET | Refills: 0 | Status: SHIPPED | OUTPATIENT
Start: 2025-03-20

## 2025-03-26 ENCOUNTER — APPOINTMENT (OUTPATIENT)
Dept: PRIMARY CARE | Facility: CLINIC | Age: 89
End: 2025-03-26
Payer: MEDICARE

## 2025-03-26 VITALS
OXYGEN SATURATION: 98 % | BODY MASS INDEX: 23.39 KG/M2 | HEIGHT: 63 IN | SYSTOLIC BLOOD PRESSURE: 109 MMHG | WEIGHT: 132 LBS | HEART RATE: 95 BPM | DIASTOLIC BLOOD PRESSURE: 66 MMHG

## 2025-03-26 DIAGNOSIS — Z00.00 WELLNESS EXAMINATION: Primary | ICD-10-CM

## 2025-03-26 DIAGNOSIS — E78.2 MIXED HYPERLIPIDEMIA: ICD-10-CM

## 2025-03-26 DIAGNOSIS — J44.9 CHRONIC OBSTRUCTIVE PULMONARY DISEASE, UNSPECIFIED COPD TYPE (MULTI): ICD-10-CM

## 2025-03-26 DIAGNOSIS — I10 BENIGN ESSENTIAL HYPERTENSION: ICD-10-CM

## 2025-03-26 DIAGNOSIS — Z00.00 ROUTINE GENERAL MEDICAL EXAMINATION AT A HEALTH CARE FACILITY: ICD-10-CM

## 2025-03-26 DIAGNOSIS — F41.9 ANXIETY AND DEPRESSION: ICD-10-CM

## 2025-03-26 DIAGNOSIS — E78.5 HYPERLIPIDEMIA, UNSPECIFIED HYPERLIPIDEMIA TYPE: ICD-10-CM

## 2025-03-26 DIAGNOSIS — J43.2 CENTRILOBULAR EMPHYSEMA (MULTI): ICD-10-CM

## 2025-03-26 DIAGNOSIS — E03.9 HYPOTHYROIDISM, UNSPECIFIED TYPE: ICD-10-CM

## 2025-03-26 DIAGNOSIS — F32.A ANXIETY AND DEPRESSION: ICD-10-CM

## 2025-03-26 DIAGNOSIS — I10 ESSENTIAL (PRIMARY) HYPERTENSION: ICD-10-CM

## 2025-03-26 PROCEDURE — 1157F ADVNC CARE PLAN IN RCRD: CPT | Performed by: EMERGENCY MEDICINE

## 2025-03-26 PROCEDURE — 1159F MED LIST DOCD IN RCRD: CPT | Performed by: EMERGENCY MEDICINE

## 2025-03-26 PROCEDURE — 99213 OFFICE O/P EST LOW 20 MIN: CPT | Performed by: EMERGENCY MEDICINE

## 2025-03-26 PROCEDURE — G0442 ANNUAL ALCOHOL SCREEN 15 MIN: HCPCS | Performed by: EMERGENCY MEDICINE

## 2025-03-26 PROCEDURE — G0444 DEPRESSION SCREEN ANNUAL: HCPCS | Performed by: EMERGENCY MEDICINE

## 2025-03-26 PROCEDURE — 3078F DIAST BP <80 MM HG: CPT | Performed by: EMERGENCY MEDICINE

## 2025-03-26 PROCEDURE — 99497 ADVNCD CARE PLAN 30 MIN: CPT | Performed by: EMERGENCY MEDICINE

## 2025-03-26 PROCEDURE — 1123F ACP DISCUSS/DSCN MKR DOCD: CPT | Performed by: EMERGENCY MEDICINE

## 2025-03-26 PROCEDURE — 1036F TOBACCO NON-USER: CPT | Performed by: EMERGENCY MEDICINE

## 2025-03-26 PROCEDURE — G0439 PPPS, SUBSEQ VISIT: HCPCS | Performed by: EMERGENCY MEDICINE

## 2025-03-26 PROCEDURE — 3074F SYST BP LT 130 MM HG: CPT | Performed by: EMERGENCY MEDICINE

## 2025-03-26 RX ORDER — TRIAMTERENE/HYDROCHLOROTHIAZID 37.5-25 MG
1 TABLET ORAL DAILY
Qty: 90 TABLET | Refills: 1 | Status: SHIPPED | OUTPATIENT
Start: 2025-03-26

## 2025-03-26 RX ORDER — DILTIAZEM HYDROCHLORIDE 60 MG/1
60 TABLET, FILM COATED ORAL DAILY
Qty: 90 TABLET | Refills: 1 | Status: SHIPPED | OUTPATIENT
Start: 2025-03-26

## 2025-03-26 RX ORDER — SIMVASTATIN 20 MG/1
20 TABLET, FILM COATED ORAL DAILY
Qty: 90 TABLET | Refills: 1 | Status: SHIPPED | OUTPATIENT
Start: 2025-03-26

## 2025-03-26 RX ORDER — ASPIRIN 81 MG/1
81 TABLET ORAL DAILY
Qty: 90 TABLET | Refills: 1 | Status: SHIPPED | OUTPATIENT
Start: 2025-03-26

## 2025-03-26 ASSESSMENT — PATIENT HEALTH QUESTIONNAIRE - PHQ9
2. FEELING DOWN, DEPRESSED OR HOPELESS: NOT AT ALL
1. LITTLE INTEREST OR PLEASURE IN DOING THINGS: NOT AT ALL
SUM OF ALL RESPONSES TO PHQ9 QUESTIONS 1 AND 2: 0
SUM OF ALL RESPONSES TO PHQ9 QUESTIONS 1 AND 2: 0
2. FEELING DOWN, DEPRESSED OR HOPELESS: NOT AT ALL
1. LITTLE INTEREST OR PLEASURE IN DOING THINGS: NOT AT ALL

## 2025-03-26 NOTE — PROGRESS NOTES
Subjective   Reason for Visit: Cecille Rico is an 89 y.o. female here for a follow up visit    Past Medical, Surgical, and Family History reviewed and updated in chart.    Reviewed all medications by prescribing practitioner or clinical pharmacist (such as prescriptions, OTCs, herbal therapies and supplements) and documented in the medical record.    HPI  88 year old female here for follow up visit     Patient is here for a follow up on long term health maintenance and medication management.  Doing well, no acute concerns.     Past history includes hypertension, hyperlipidemia, COPD, current and chronic nicotine dependence, previous colonoscopy, kidney stone, rib fracture     Family history is not contributory     Social history-continues to smoke  Denies alcohol or drugs    Patient Care Team:  Jaylan Ba MD as PCP - General (Internal Medicine)     Review of Systems  Comprehensive review of symptoms was not positive for any symptoms other than HPI.      Objective       Physical Exam  General appearance: Alert and in no acute distress  HEENT: Normal Inspection.  Neck: Normal Inspectiopn  Respiratory: No respiratory distress.   Cardiovascular: heart rate normal. No gallop  Back: normal inspection  Skin inspection: Warm  Musculoskeletal: No deformities  Neuro: Grossly Intact  Psychiatric: Cooperative     Assessment/Plan   Problem List Items Addressed This Visit    None    Patient presents for follow up visit    Multiple rib fractures - symptoms resolved     Hypertension - well controlled on triamterene-HCTZ and Cardizem     Hyperlipidemia - Patient is on simvastatin     Glaucoma - she is on eyedrops and has a regular eye doctor follow-up.     COPD - currently does not take any medications, patient uses oxygen as necessary.     Anxiety and depression - doing well without medications, has declined medication in past.     Left breast abnormality found on CT - follow up mammogram negative for acute abnormality      Labs ordered     Preventive care  At the age of 89, she is not a candidate for any preventive care      PH Q-9 depression screening was completed by authorized employee of the practice for 5-10 minutes and  explained the questionnaire and discussed the answers with the patient.    Alcohol screening was completed for 5 to 10 minutes    I discussed advanced care planning for more than 16 minutes including the explanation and discussion of advanced directives. Information and advise was also provided on DO NOT RESUSCITATE and patient encouraged to consider this  Patient states that she has signed DNR at home    Follow-up in 3 months or as needed      Follow up in 3 months or as necessary

## 2025-05-16 DIAGNOSIS — E78.5 HYPERLIPIDEMIA, UNSPECIFIED HYPERLIPIDEMIA TYPE: ICD-10-CM

## 2025-05-16 RX ORDER — SIMVASTATIN 20 MG/1
20 TABLET, FILM COATED ORAL DAILY
Qty: 90 TABLET | Refills: 1 | Status: SHIPPED | OUTPATIENT
Start: 2025-05-16

## 2025-06-13 ENCOUNTER — OFFICE VISIT (OUTPATIENT)
Dept: PRIMARY CARE | Facility: CLINIC | Age: 89
End: 2025-06-13
Payer: MEDICARE

## 2025-06-13 DIAGNOSIS — J44.9 CHRONIC OBSTRUCTIVE PULMONARY DISEASE, UNSPECIFIED COPD TYPE (MULTI): Primary | ICD-10-CM

## 2025-06-13 DIAGNOSIS — E78.2 MIXED HYPERLIPIDEMIA: ICD-10-CM

## 2025-06-13 DIAGNOSIS — E03.9 HYPOTHYROIDISM, UNSPECIFIED TYPE: ICD-10-CM

## 2025-06-13 DIAGNOSIS — I10 BENIGN ESSENTIAL HYPERTENSION: ICD-10-CM

## 2025-06-13 DIAGNOSIS — J32.9 SINUSITIS, UNSPECIFIED CHRONICITY, UNSPECIFIED LOCATION: ICD-10-CM

## 2025-06-13 DIAGNOSIS — J43.2 CENTRILOBULAR EMPHYSEMA (MULTI): ICD-10-CM

## 2025-06-13 PROCEDURE — 1159F MED LIST DOCD IN RCRD: CPT | Performed by: EMERGENCY MEDICINE

## 2025-06-13 PROCEDURE — 99214 OFFICE O/P EST MOD 30 MIN: CPT | Performed by: EMERGENCY MEDICINE

## 2025-06-13 RX ORDER — LEVOFLOXACIN 500 MG/1
500 TABLET, FILM COATED ORAL DAILY
Qty: 7 TABLET | Refills: 0 | Status: SHIPPED | OUTPATIENT
Start: 2025-06-13 | End: 2025-06-20

## 2025-06-13 ASSESSMENT — ENCOUNTER SYMPTOMS: COUGH: 1

## 2025-06-13 NOTE — PROGRESS NOTES
Subjective   Reason for Visit: Cecille Rico is an 89 y.o. female here for a follow up visit         Reviewed all medications by prescribing practitioner or clinical pharmacist (such as prescriptions, OTCs, herbal therapies and supplements) and documented in the medical record.    88 year old female here for follow up visit     Coughing runny nose sore throat    Past history includes hypertension, hyperlipidemia, COPD, current and chronic nicotine dependence, previous colonoscopy, kidney stone, rib fracture     Family history is not contributory     Social history-continues to smoke  Denies alcohol or drugs    Patient Care Team:  Jaylan Ba MD as PCP - General (Internal Medicine)     Review of Systems   Respiratory:  Positive for cough.      Comprehensive review of symptoms was not positive for any symptoms other than HPI.      Objective       Physical Exam  General appearance: Alert and in no acute distress  HEENT: Normal Inspection.  Neck: Normal Inspectiopn  Respiratory: No respiratory distress.   Cardiovascular: heart rate normal. No gallop  Back: normal inspection  Skin inspection: Warm  Musculoskeletal: No deformities  Neuro: Grossly Intact  Psychiatric: Cooperative     Assessment/Plan   Problem List Items Addressed This Visit    None    Patient presents for follow up visit  Last Medicare wellness in March 2025    Bronchitis-Levaquin    Borderline low blood pressure-patient systolic blood pressure 90.  She is awake alert oriented.  Counseled her to hold her blood pressure medicine till her blood pressure readings are above 110 220 systolic    Pulse ox of 88%-patient is on oxygen at home takes it as she feels she needs it.  Counseled her that she should use oxygen if her pulse ox is below 88 to 90%    Multiple rib fractures - symptoms resolved     Hypertension - well controlled on triamterene-HCTZ and Cardizem     Hyperlipidemia - Patient is on simvastatin     Glaucoma - she is on eyedrops and has a  regular eye doctor follow-up.     COPD - currently does not take any medications, patient uses oxygen as necessary.     Anxiety and depression - doing well without medications, has declined medication in past.     Left breast abnormality found on CT - follow up mammogram negative for acute abnormality     Labs ordered     Preventive care  At the age of 89, she is not a candidate for any preventive care      Follow up in 3 months or as necessary

## 2025-07-01 ENCOUNTER — TELEPHONE (OUTPATIENT)
Dept: PRIMARY CARE | Facility: CLINIC | Age: 89
End: 2025-07-01
Payer: MEDICARE

## 2025-07-01 NOTE — TELEPHONE ENCOUNTER
Patient needs  script for humidifier. Medical services.     Please call the daughter for script what she needs.   Fax number - [phone]

## 2025-07-17 ENCOUNTER — TELEPHONE (OUTPATIENT)
Dept: PRIMARY CARE | Facility: CLINIC | Age: 89
End: 2025-07-17
Payer: MEDICARE

## 2025-07-17 NOTE — TELEPHONE ENCOUNTER
Spoke to daughter and told her the letter was ready for patient. Stated to mail it to patients house

## 2025-07-17 NOTE — TELEPHONE ENCOUNTER
Pt's daughter (Merary Sethi) would like to know if patient can have a letter that she can carry with her when she goes to venues saying that she has to have her oxygen with oxygen concentrator and tank when she goes to certain places. Would like to know if you are okay with a letter like this for her?     Daughter: [phone]

## 2025-09-24 ENCOUNTER — APPOINTMENT (OUTPATIENT)
Dept: PRIMARY CARE | Facility: CLINIC | Age: 89
End: 2025-09-24
Payer: MEDICARE